# Patient Record
Sex: FEMALE
[De-identification: names, ages, dates, MRNs, and addresses within clinical notes are randomized per-mention and may not be internally consistent; named-entity substitution may affect disease eponyms.]

---

## 2024-01-08 ENCOUNTER — APPOINTMENT (OUTPATIENT)
Dept: MAMMOGRAPHY | Facility: CLINIC | Age: 49
End: 2024-01-08
Payer: MEDICAID

## 2024-01-08 PROCEDURE — 77063 BREAST TOMOSYNTHESIS BI: CPT

## 2024-01-08 PROCEDURE — 77067 SCR MAMMO BI INCL CAD: CPT

## 2024-01-10 ENCOUNTER — APPOINTMENT (OUTPATIENT)
Dept: OBGYN | Facility: CLINIC | Age: 49
End: 2024-01-10
Payer: MEDICAID

## 2024-01-10 VITALS
DIASTOLIC BLOOD PRESSURE: 69 MMHG | BODY MASS INDEX: 24.35 KG/M2 | WEIGHT: 129 LBS | HEIGHT: 61 IN | SYSTOLIC BLOOD PRESSURE: 112 MMHG

## 2024-01-10 DIAGNOSIS — Z78.9 OTHER SPECIFIED HEALTH STATUS: ICD-10-CM

## 2024-01-10 DIAGNOSIS — Z01.419 ENCOUNTER FOR GYNECOLOGICAL EXAMINATION (GENERAL) (ROUTINE) W/OUT ABNORMAL FINDINGS: ICD-10-CM

## 2024-01-10 PROBLEM — Z00.00 ENCOUNTER FOR PREVENTIVE HEALTH EXAMINATION: Status: ACTIVE | Noted: 2024-01-10

## 2024-01-10 LAB
HCG UR QL: NEGATIVE
QUALITY CONTROL: YES

## 2024-01-10 PROCEDURE — 81025 URINE PREGNANCY TEST: CPT

## 2024-01-10 PROCEDURE — 99386 PREV VISIT NEW AGE 40-64: CPT | Mod: 25

## 2024-01-10 NOTE — PHYSICAL EXAM
[Appropriately responsive] : appropriately responsive [Alert] : alert [No Acute Distress] : no acute distress [No Lymphadenopathy] : no lymphadenopathy [Regular Rate Rhythm] : regular rate rhythm [No Murmurs] : no murmurs [Clear to Auscultation B/L] : clear to auscultation bilaterally [Soft] : soft [Non-tender] : non-tender [Non-distended] : non-distended [No HSM] : No HSM [No Lesions] : no lesions [No Mass] : no mass [Oriented x3] : oriented x3 [FreeTextEntry6] : bilateral implants [Examination Of The Breasts] : a normal appearance [No Masses] : no breast masses were palpable [Labia Majora] : normal [Labia Minora] : normal [Normal] : normal [Uterine Adnexae] : normal

## 2024-01-10 NOTE — PLAN
[FreeTextEntry1] : unremarkable CBE and pelvic exams SBE  reviewed Offered BRCA testing - patient will read brochure and check with insurance for coverage.  WIll call to schedule Pap/HPV collected Mammo ordered I reviewed dietary, vitamin and exercise measures for maintaining optimal bone density and patient verbalizes understanding of these recommendations. Healthy diet,exercise and sleep hygiene discussed The importance of a screening colonoscopy was discussed.

## 2024-01-10 NOTE — HISTORY OF PRESENT ILLNESS
[Patient reported PAP Smear was normal] : Patient reported PAP Smear was normal [HIV test declined] : HIV test declined [Syphilis test declined] : Syphilis test declined [Gonorrhea test declined] : Gonorrhea test declined [Chlamydia test declined] : Chlamydia test declined [Trichomonas test declined] : Trichomonas test declined [HPV test offered] : HPV test offered [N] : Patient reports normal menses [Tubal Occlusion] : has had a tubal occlusion [Y] : Positive pregnancy history [TextBox_4] : 49 yo   for well woman exam today. Strong family hx reproductive aunt  from breast CA, sister survived ovarian cancer, cousing with breast CA.  No hx abnormal pap smears, abnormal bleeding, fibroids, cysts.  No urinary complaints.  Past medical, surgical, social and family hx reviewed. [Mammogramdate] : 1/2024 [PapSmeardate] : 2023 [LMPDate] : 12/24/23 [PGHxTotal] : 3 [[facility]] : 3 [[facility]] : 3

## 2024-01-11 LAB — HPV HIGH+LOW RISK DNA PNL CVX: NOT DETECTED

## 2024-01-13 LAB — CYTOLOGY CVX/VAG DOC THIN PREP: NORMAL

## 2024-01-16 ENCOUNTER — APPOINTMENT (OUTPATIENT)
Dept: ULTRASOUND IMAGING | Facility: CLINIC | Age: 49
End: 2024-01-16
Payer: MEDICAID

## 2024-01-16 PROCEDURE — 76642 ULTRASOUND BREAST LIMITED: CPT | Mod: RT

## 2025-06-12 ENCOUNTER — EMERGENCY (EMERGENCY)
Facility: HOSPITAL | Age: 50
LOS: 1 days | End: 2025-06-12
Attending: EMERGENCY MEDICINE
Payer: COMMERCIAL

## 2025-06-12 VITALS
RESPIRATION RATE: 18 BRPM | TEMPERATURE: 99 F | SYSTOLIC BLOOD PRESSURE: 110 MMHG | OXYGEN SATURATION: 98 % | DIASTOLIC BLOOD PRESSURE: 66 MMHG | HEART RATE: 64 BPM

## 2025-06-12 LAB
ALBUMIN SERPL ELPH-MCNC: 3.4 G/DL — LOW (ref 3.5–5)
ALP SERPL-CCNC: 74 U/L — SIGNIFICANT CHANGE UP (ref 40–120)
ALT FLD-CCNC: 24 U/L DA — SIGNIFICANT CHANGE UP (ref 10–60)
ANION GAP SERPL CALC-SCNC: 2 MMOL/L — LOW (ref 5–17)
APTT BLD: 25.6 SEC — LOW (ref 26.1–36.8)
AST SERPL-CCNC: 22 U/L — SIGNIFICANT CHANGE UP (ref 10–40)
BASOPHILS # BLD AUTO: 0.05 K/UL — SIGNIFICANT CHANGE UP (ref 0–0.2)
BASOPHILS NFR BLD AUTO: 0.5 % — SIGNIFICANT CHANGE UP (ref 0–2)
BILIRUB SERPL-MCNC: 0.2 MG/DL — SIGNIFICANT CHANGE UP (ref 0.2–1.2)
BLD GP AB SCN SERPL QL: SIGNIFICANT CHANGE UP
BUN SERPL-MCNC: 13 MG/DL — SIGNIFICANT CHANGE UP (ref 7–18)
CALCIUM SERPL-MCNC: 8.5 MG/DL — SIGNIFICANT CHANGE UP (ref 8.4–10.5)
CHLORIDE SERPL-SCNC: 108 MMOL/L — SIGNIFICANT CHANGE UP (ref 96–108)
CO2 SERPL-SCNC: 27 MMOL/L — SIGNIFICANT CHANGE UP (ref 22–31)
CREAT SERPL-MCNC: 0.7 MG/DL — SIGNIFICANT CHANGE UP (ref 0.5–1.3)
D DIMER BLD IA.RAPID-MCNC: 172 NG/ML DDU — SIGNIFICANT CHANGE UP
EGFR: 107 ML/MIN/1.73M2 — SIGNIFICANT CHANGE UP
EGFR: 107 ML/MIN/1.73M2 — SIGNIFICANT CHANGE UP
EOSINOPHIL # BLD AUTO: 0.21 K/UL — SIGNIFICANT CHANGE UP (ref 0–0.5)
EOSINOPHIL NFR BLD AUTO: 2.3 % — SIGNIFICANT CHANGE UP (ref 0–6)
GLUCOSE SERPL-MCNC: 92 MG/DL — SIGNIFICANT CHANGE UP (ref 70–99)
HCG SERPL-ACNC: <1 MIU/ML — SIGNIFICANT CHANGE UP
HCT VFR BLD CALC: 37.1 % — SIGNIFICANT CHANGE UP (ref 34.5–45)
HGB BLD-MCNC: 12.6 G/DL — SIGNIFICANT CHANGE UP (ref 11.5–15.5)
IMM GRANULOCYTES NFR BLD AUTO: 0.1 % — SIGNIFICANT CHANGE UP (ref 0–0.9)
INR BLD: 0.91 RATIO — SIGNIFICANT CHANGE UP (ref 0.85–1.16)
LYMPHOCYTES # BLD AUTO: 3.28 K/UL — SIGNIFICANT CHANGE UP (ref 1–3.3)
LYMPHOCYTES # BLD AUTO: 35.5 % — SIGNIFICANT CHANGE UP (ref 13–44)
MAGNESIUM SERPL-MCNC: 2.3 MG/DL — SIGNIFICANT CHANGE UP (ref 1.6–2.6)
MCHC RBC-ENTMCNC: 28.6 PG — SIGNIFICANT CHANGE UP (ref 27–34)
MCHC RBC-ENTMCNC: 34 G/DL — SIGNIFICANT CHANGE UP (ref 32–36)
MCV RBC AUTO: 84.1 FL — SIGNIFICANT CHANGE UP (ref 80–100)
MONOCYTES # BLD AUTO: 0.67 K/UL — SIGNIFICANT CHANGE UP (ref 0–0.9)
MONOCYTES NFR BLD AUTO: 7.2 % — SIGNIFICANT CHANGE UP (ref 2–14)
NEUTROPHILS # BLD AUTO: 5.03 K/UL — SIGNIFICANT CHANGE UP (ref 1.8–7.4)
NEUTROPHILS NFR BLD AUTO: 54.4 % — SIGNIFICANT CHANGE UP (ref 43–77)
NRBC BLD AUTO-RTO: 0 /100 WBCS — SIGNIFICANT CHANGE UP (ref 0–0)
PLATELET # BLD AUTO: 296 K/UL — SIGNIFICANT CHANGE UP (ref 150–400)
POTASSIUM SERPL-MCNC: 3.5 MMOL/L — SIGNIFICANT CHANGE UP (ref 3.5–5.3)
POTASSIUM SERPL-SCNC: 3.5 MMOL/L — SIGNIFICANT CHANGE UP (ref 3.5–5.3)
PROT SERPL-MCNC: 7.3 G/DL — SIGNIFICANT CHANGE UP (ref 6–8.3)
PROTHROM AB SERPL-ACNC: 10.6 SEC — SIGNIFICANT CHANGE UP (ref 9.9–13.4)
RBC # BLD: 4.41 M/UL — SIGNIFICANT CHANGE UP (ref 3.8–5.2)
RBC # FLD: 13.2 % — SIGNIFICANT CHANGE UP (ref 10.3–14.5)
SODIUM SERPL-SCNC: 137 MMOL/L — SIGNIFICANT CHANGE UP (ref 135–145)
WBC # BLD: 9.25 K/UL — SIGNIFICANT CHANGE UP (ref 3.8–10.5)
WBC # FLD AUTO: 9.25 K/UL — SIGNIFICANT CHANGE UP (ref 3.8–10.5)

## 2025-06-12 PROCEDURE — 84702 CHORIONIC GONADOTROPIN TEST: CPT

## 2025-06-12 PROCEDURE — 85730 THROMBOPLASTIN TIME PARTIAL: CPT

## 2025-06-12 PROCEDURE — 85379 FIBRIN DEGRADATION QUANT: CPT

## 2025-06-12 PROCEDURE — 83735 ASSAY OF MAGNESIUM: CPT

## 2025-06-12 PROCEDURE — 99283 EMERGENCY DEPT VISIT LOW MDM: CPT

## 2025-06-12 PROCEDURE — 85025 COMPLETE CBC W/AUTO DIFF WBC: CPT

## 2025-06-12 PROCEDURE — 86850 RBC ANTIBODY SCREEN: CPT

## 2025-06-12 PROCEDURE — 86901 BLOOD TYPING SEROLOGIC RH(D): CPT

## 2025-06-12 PROCEDURE — 85610 PROTHROMBIN TIME: CPT

## 2025-06-12 PROCEDURE — 99284 EMERGENCY DEPT VISIT MOD MDM: CPT

## 2025-06-12 PROCEDURE — 86900 BLOOD TYPING SEROLOGIC ABO: CPT

## 2025-06-12 PROCEDURE — 36415 COLL VENOUS BLD VENIPUNCTURE: CPT

## 2025-06-12 PROCEDURE — 80053 COMPREHEN METABOLIC PANEL: CPT

## 2025-06-12 NOTE — ED PROVIDER NOTE - NSFOLLOWUPINSTRUCTIONS_ED_ALL_ED_FT
Edema periférico  Peripheral Edema  A person's legs and feet. One leg is normal and the other leg is affected by edema.  El edema periférico es la hinchazón causada por la acumulación de líquido. En la mayoría de los casos, el edema periférico afecta la parte inferior de las piernas, los tobillos y los pies. También puede afectar los brazos, las toyin y la mariama. La rafal del cuerpo que presenta edema periférico se verá hinchada. También puede sentirse pesada o caliente. Es posible que sienta que la ropa comienza a apretarle. La presión sobre la rafal puede dejar milagros escobar temporal en la piel (edema con fóvea). Addi vez no pueda  el brazo o la pierna hinchados harjit lo hace habitualmente.    Hay muchas causas posibles de edema periférico. Puede deberse a milagros complicación de otras afecciones, harjit insuficiencia cardíaca, enfermedad renal o un problema con la circulación. También puede ser un efecto secundario de ciertos medicamentos o deberse a milagros infección. Es frecuente marshal el embarazo. A veces, la causa es desconocida.    Siga estas instrucciones en portillo casa:  Control del dolor, la rigidez y la hinchazón    Compression stockings on a person's lower legs.  Levante (eleve) las piernas mientras esté sentado o recostado.  Muévase con frecuencia para evitar la rigidez y reducir la hinchazón.  No permanezca sentado o de pie marshal largos períodos.  No use ropa ajustada. No use ligas en la parte superior de las piernas.  Ejercite las piernas para activar la circulación. [place] ayuda a que el líquido pase nuevamente a sadiq vasos sanguíneos, y puede ayudar a reducir la hinchazón.  Use medias de compresión harjit se lo haya indicado portillo médico. Estas medias ayudan a evitar la formación de coágulos de rell y a reducir la hinchazón de las piernas. Es importante que jimmie del tamaño correcto. Estas medias deben ser prescritas por portillo médico para evitar posibles lesiones.  Si le recomiendan vendajes, úselos harjit se lo haya indicado el médico.  Medicamentos    Use los medicamentos de venta margo y los recetados solamente harjit se lo haya indicado el médico.  El médico puede recetarle un medicamento para ayudar a que el cuerpo elimine el exceso de agua (diurético). [place] [employer] medicamento si se lo indican.  Instrucciones generales    Siga milagros dieta con poco contenido de sal (sodio) según las indicaciones del médico. A veces, disminuir el consumo de sal puede reducir la hinchazón.  Esté atento a cualquier cambio en los síntomas.  Huméctese la piel todos los días para evitar que se agriete y se seque.  Concurra a todas las visitas de seguimiento. [place] es importante.  Comuníquese con un médico si:  Tiene fiebre.  Tiene hinchazón en milagros nicolas pierna.  Tiene más hinchazón, enrojecimiento o dolor en milagros pierna o en ambas.  Tiene secreción o llagas en la rafal donde tiene edema.  Solicite ayuda de inmediato si:  Tiene un edema que aparece de forma repentina o empeora, en especial, si está embarazada o tiene milagros enfermedad.  Le falta el aire, especialmente al estar acostado.  Tiene dolor en el pecho o el abdomen.  Se siente débil.  Se siente harjit si fuera a desmayarse.  Estos síntomas pueden indicar milagros emergencia. Solicite ayuda de inmediato. Llame al 911.  No espere a gaurav si los síntomas desaparecen.  No conduzca por sadiq propios medios hasta el hospital.  Resumen  El edema periférico es la hinchazón causada por la acumulación de líquido. En la mayoría de los casos, el edema periférico afecta la parte inferior de las piernas, los tobillos y los pies.  Muévase con frecuencia para evitar la rigidez y reducir la hinchazón. No permanezca sentado o de pie marshal largos períodos.  Esté atento a cualquier cambio en los síntomas.  Comuníquese con un médico si tiene un edema que aparece de forma repentina o empeora, en especial si usted está embarazada o tiene milagros afección médica.  Busque ayuda de inmediato si le falta el aire, especialmente al estar acostado.  Esta información no tiene harjit fin reemplazar el consejo del médico. Asegúrese de hacerle al médico cualquier pregunta que tenga.       follow-up with your medical doctor

## 2025-06-12 NOTE — ED PROVIDER NOTE - CLINICAL SUMMARY MEDICAL DECISION MAKING FREE TEXT BOX
49-year-old female with acute onset of right leg swelling, head Doppler done with negative for DVT, had blood work done told her coags were abnormal and advised to come to the ED for further evaluation.  Patient is not on any medications.  Will repeat her labs, abnormal coag can be from Lab error, hyper coagulation    2103 labs result explained to patient   patient's coags are normal, negative D-dimer, will discharge home   advised to follow-up with PCP

## 2025-06-12 NOTE — ED PROVIDER NOTE - MUSCULOSKELETAL, MLM
Spine appears normal, range of motion is not limited, no muscle or joint tenderness, no calf tenderness, no CVAT
Well FT AGA male

## 2025-06-12 NOTE — ED PROVIDER NOTE - OBJECTIVE STATEMENT
RN Les Farah interprets   49-year-old female with no PMH, not on any meds, LMP 5/19.  Patient went to her PCP for right leg swelling for the past 4 weeks, had Doppler done was negative, she also had lab work done along with coag 20 days ago.  Patient was told that her coags were abnormal and advised to go to ED for further evaluation.  Patient denies trauma, recent traveling, PCP, SOB, anticoagulants, pain

## 2025-06-12 NOTE — ED PROVIDER NOTE - PATIENT PORTAL LINK FT
You can access the FollowMyHealth Patient Portal offered by [facility] by registering at the following website: [email or web address]. By joining [employer]’s FollowMyHealth portal, you will also be able to view your health information using other applications (apps) compatible with our system.

## 2025-06-12 NOTE — ED ADULT NURSE NOTE - OBJECTIVE STATEMENT
Patient sent to ED for further evaluation of RLE swelling x1 month s/p negative findings on sonogram & recent abnormal coagulation lab results. Patient reports intermittent shortness of breath x1 week, denies chest pain or active pain in calf.